# Patient Record
Sex: FEMALE | Employment: UNEMPLOYED | URBAN - METROPOLITAN AREA
[De-identification: names, ages, dates, MRNs, and addresses within clinical notes are randomized per-mention and may not be internally consistent; named-entity substitution may affect disease eponyms.]

---

## 2019-03-23 ENCOUNTER — HOSPITAL ENCOUNTER (EMERGENCY)
Facility: CLINIC | Age: 39
Discharge: HOME | End: 2019-03-23
Attending: EMERGENCY MEDICINE | Admitting: EMERGENCY MEDICINE
Payer: COMMERCIAL

## 2019-03-23 VITALS
SYSTOLIC BLOOD PRESSURE: 112 MMHG | WEIGHT: 114 LBS | BODY MASS INDEX: 17.89 KG/M2 | OXYGEN SATURATION: 99 % | RESPIRATION RATE: 16 BRPM | TEMPERATURE: 98.6 F | HEART RATE: 88 BPM | DIASTOLIC BLOOD PRESSURE: 64 MMHG | HEIGHT: 67 IN

## 2019-03-23 DIAGNOSIS — R42 LIGHT HEADEDNESS: ICD-10-CM

## 2019-03-23 DIAGNOSIS — R07.89 CHEST PRESSURE: ICD-10-CM

## 2019-03-23 LAB
ALBUMIN UR-MCNC: NEGATIVE MG/DL
ANION GAP SERPL CALCULATED.3IONS-SCNC: 12 MMOL/L (ref 3–14)
APPEARANCE UR: CLEAR
BASOPHILS # BLD AUTO: 0 10E9/L (ref 0–0.2)
BASOPHILS NFR BLD AUTO: 0.2 %
BILIRUB UR QL STRIP: NEGATIVE
BUN SERPL-MCNC: 4 MG/DL (ref 7–30)
CALCIUM SERPL-MCNC: 8.7 MG/DL (ref 8.5–10.1)
CHLORIDE SERPL-SCNC: 101 MMOL/L (ref 94–109)
CO2 SERPL-SCNC: 19 MMOL/L (ref 20–32)
COLOR UR AUTO: ABNORMAL
CREAT SERPL-MCNC: 0.57 MG/DL (ref 0.52–1.04)
DIFFERENTIAL METHOD BLD: ABNORMAL
EOSINOPHIL # BLD AUTO: 0 10E9/L (ref 0–0.7)
EOSINOPHIL NFR BLD AUTO: 0.2 %
ERYTHROCYTE [DISTWIDTH] IN BLOOD BY AUTOMATED COUNT: 11.7 % (ref 10–15)
GFR SERPL CREATININE-BSD FRML MDRD: >90 ML/MIN/{1.73_M2}
GLUCOSE SERPL-MCNC: 85 MG/DL (ref 70–99)
GLUCOSE UR STRIP-MCNC: NEGATIVE MG/DL
HCT VFR BLD AUTO: 34.5 % (ref 35–47)
HGB BLD-MCNC: 12.3 G/DL (ref 11.7–15.7)
HGB UR QL STRIP: NEGATIVE
IMM GRANULOCYTES # BLD: 0 10E9/L (ref 0–0.4)
IMM GRANULOCYTES NFR BLD: 0.2 %
INTERPRETATION ECG - MUSE: NORMAL
KETONES UR STRIP-MCNC: 40 MG/DL
LEUKOCYTE ESTERASE UR QL STRIP: NEGATIVE
LYMPHOCYTES # BLD AUTO: 1.1 10E9/L (ref 0.8–5.3)
LYMPHOCYTES NFR BLD AUTO: 18.2 %
MCH RBC QN AUTO: 31.4 PG (ref 26.5–33)
MCHC RBC AUTO-ENTMCNC: 35.7 G/DL (ref 31.5–36.5)
MCV RBC AUTO: 88 FL (ref 78–100)
MONOCYTES # BLD AUTO: 0.4 10E9/L (ref 0–1.3)
MONOCYTES NFR BLD AUTO: 6 %
NEUTROPHILS # BLD AUTO: 4.5 10E9/L (ref 1.6–8.3)
NEUTROPHILS NFR BLD AUTO: 75.2 %
NITRATE UR QL: NEGATIVE
NRBC # BLD AUTO: 0 10*3/UL
NRBC BLD AUTO-RTO: 0 /100
PH UR STRIP: 5 PH (ref 5–7)
PLATELET # BLD AUTO: 172 10E9/L (ref 150–450)
POTASSIUM SERPL-SCNC: 3.6 MMOL/L (ref 3.4–5.3)
RBC # BLD AUTO: 3.92 10E12/L (ref 3.8–5.2)
SODIUM SERPL-SCNC: 132 MMOL/L (ref 133–144)
SOURCE: ABNORMAL
SP GR UR STRIP: 1 (ref 1–1.03)
TROPONIN I SERPL-MCNC: <0.015 UG/L (ref 0–0.04)
UROBILINOGEN UR STRIP-MCNC: NORMAL MG/DL (ref 0–2)
WBC # BLD AUTO: 6 10E9/L (ref 4–11)

## 2019-03-23 PROCEDURE — 93005 ELECTROCARDIOGRAM TRACING: CPT

## 2019-03-23 PROCEDURE — 99284 EMERGENCY DEPT VISIT MOD MDM: CPT

## 2019-03-23 PROCEDURE — 85025 COMPLETE CBC W/AUTO DIFF WBC: CPT | Performed by: EMERGENCY MEDICINE

## 2019-03-23 PROCEDURE — 80048 BASIC METABOLIC PNL TOTAL CA: CPT | Performed by: EMERGENCY MEDICINE

## 2019-03-23 PROCEDURE — 81003 URINALYSIS AUTO W/O SCOPE: CPT | Performed by: EMERGENCY MEDICINE

## 2019-03-23 PROCEDURE — 84484 ASSAY OF TROPONIN QUANT: CPT | Performed by: EMERGENCY MEDICINE

## 2019-03-23 ASSESSMENT — ENCOUNTER SYMPTOMS
FEVER: 0
LIGHT-HEADEDNESS: 1
BLOOD IN STOOL: 0
SHORTNESS OF BREATH: 0
VOMITING: 0
DIARRHEA: 0
FATIGUE: 1
HEMATURIA: 0
DIFFICULTY URINATING: 0
DYSURIA: 0
COUGH: 0
WEAKNESS: 1
ABDOMINAL PAIN: 0

## 2019-03-23 ASSESSMENT — MIFFLIN-ST. JEOR: SCORE: 1229.73

## 2019-03-23 NOTE — ED AVS SNAPSHOT
Emergency Department  [address] [place] [id number]  Phone:  [phone]  Fax:  [phone]                                    Jolly Rogers   MRN: [id number]    Department:   Emergency Department   Date of Visit:  3/23/2019           After Visit Summary Signature Page    I have received my discharge instructions, and my questions have been answered. I have discussed any challenges I see with this plan with the nurse or doctor.    ..........................................................................................................................................  Patient/Patient Representative Signature      ..........................................................................................................................................  Patient Representative Print Name and Relationship to Patient    ..................................................               ................................................  Date                                   Time    ..........................................................................................................................................  Reviewed by Signature/Title    ...................................................              ..............................................  Date                                               Time          22EPIC Rev 08/18

## 2019-03-23 NOTE — ED PROVIDER NOTES
History     Chief Complaint:  Fatigue     HPI   Jolly Rogers is a 39 year old female who presents to the emergency department today for evaluation of fatigue. Patient states she has been feeling increased fatigue and weakness since yesterday. She also endorses feeling light headed, almost as if she is going to pass out, however, lying down makes her feel better. Additionally, she endorses chest pressure and difficulty finding her words. She states when the nurse was asking her questions that she knew the answer to, it was taking her a longer time to find the right response. Patient denies chest pain, shortness of breath, fever, cough, cold, diarrhea, vomiting, black bloody stools, abdominal pain, leg swelling, vaginal bleeding, difficulty urinating, dysuria, or hematuria. She states there is no chance of pregnancy.    Cardiac/PE/DVT Risk Factors:  History of hypertension - negative  History of hyperlipidemia - negative  History of diabetes - negative  History of smoking - negative  Family history of heart complications - positive  Recent travel - negative  Recent surgery - negative  Hormone use - negative    Allergies:  No Known Drug Allergies    Medications:    Medications reviewed. No current medications.     Past Medical History:    Medical history reviewed. No pertinent medical history.    Past Surgical History:    Surgical history reviewed. No pertinent surgical history.    Family History:    Family history reviewed. No pertinent family history.     Social History:  The patient was accompanied to the ED by herself.  Smoking Status: Never Smoker  Smokeless Tobacco: Never Used  Alcohol Use: Positive  Drug Use: Negative    Review of Systems   Constitutional: Positive for fatigue. Negative for fever.   Respiratory: Negative for cough and shortness of breath.         Chest pressure   Cardiovascular: Negative for chest pain and leg swelling.   Gastrointestinal: Negative for abdominal pain, blood in stool,  "diarrhea and vomiting.   Genitourinary: Negative for difficulty urinating, dysuria, hematuria and vaginal bleeding.   Neurological: Positive for weakness and light-headedness.        Difficulty word finding   All other systems reviewed and are negative.      Physical Exam     Patient Vitals for the past 24 hrs:   BP Temp Temp src Pulse Heart Rate Resp SpO2 Height Weight   03/23/19 1740 112/64 98.6  F (37  C) Oral 88 -- 16 99 % -- --   03/23/19 1524 117/59 98.6  F (37  C) Temporal -- 90 14 100 % 1.702 m (5' 7\") 51.7 kg (114 lb)       Physical Exam  Eyes:  The pupils are equal and round    Conjunctivae and sclerae are normal  ENT:    The nose is normal    Pinnae are normal    The oropharynx is normal  CV:  Regular rate and rhythm     No edema  Resp:  Lungs are clear    Non-labored    No rales    No wheezing   GI:  Abdomen is soft and non-tender, there is no rigidity    No distension    No rebound tenderness   MS:  Normal muscular tone    No asymmetric leg swelling  Skin:  No rash or acute skin lesions noted  Neuro:   Awake, alert.      Speech is normal and fluent.    EOMI    PERRL     equal in upper extremities    Face is symmetric.     Moves all extremities    Emergency Department Course     ECG:  ECG taken at 1541, ECG read at 1600  Normal sinus rhythm  ST & T wave abnormality, consider inferior ischemia  Abnormal ECG  Rate 86 bpm. AR interval 138 ms. QRS duration 74 ms. QT/QTc 348/416 ms. P-R-T axes * 71 230.    Laboratory:  Laboratory findings were communicated with the patient who voiced understanding of the findings.    UA reflex to microscopic and culture: ketones 40(A), specific gravity 1.001(A), o/w WNL  Troponin (Collected 1620): <0.015  CBC: WBC 6.0, HGB 12.3,   BMP: sodium 132(L), carbon dioxide 19(L), urea nitrogen 4(L) o/w WNL (Creatinine 0.57)    Emergency Department Course:    1543 The patient provided a urine sample here in the emergency department. This was sent for laboratory testing, " findings above.    1601 Nursing notes and vitals reviewed.    1603 I performed an exam of the patient as documented above.     1620 IV was inserted and blood was drawn for laboratory testing, results above.    1658 Patient rechecked and updated.     1733 Patient rechecked and updated.     1741 I personally reviewed the lab results with the patient and answered all related questions prior to discharge.    Impression & Plan      Medical Decision Making:  Jolly Rogers is a 39 year old female who presents to the emergency department today for evaluation of dizziness, chest pressure and feeling fatigued and mentally foggy.  This is been happening since yesterday.  She is been fasting since that time.  Labs here reveal a low bicarbonate level.  Her sodium is also slightly low.  I think it is likely secondary to not having anything to eat for the past 24 hours.  Troponin was negative.  EKG showed nonspecific changes.  Given duration of symptoms I doubt acute coronary syndrome.  No vital sign abnormalities, medications, hemoptysis or other symptoms to suggest pulmonary embolism.  She is PERC negative.    She also did bring up concerns about possibly being poisoned by [place].  She is from [facility] and has been posting on social media about her support for the [place] president and is worried that they may be trying to harm her due to her support.  She does not know that she is specifically poison.  She is concerned that radiation or other poisoning may be causing her symptoms.    Vital signs here are normal.  Her neurologic exam is normal, she is clear with her history and does not seem to have any difficulty remembering things at this time.  I encouraged her to try eating something or having an infusion of dextrose.  She ultimately declined and preferred to be discharged home.    Encouraged her to follow-up with primary care provider return with any new or concerning symptoms.    Diagnosis:    ICD-10-CM    1. Light  headedness R42    2. Chest pressure R07.89      Disposition:   The patient is discharged to home.    Discharge Medications:  No discharge medications    Scribe Disclosure:  I, Prerna Palma, am serving as a scribe at 4:02 PM on 3/23/2019 to document services personally performed by Georges Tam MD based on my observations and the provider's statements to me.     EMERGENCY DEPARTMENT       Georges Tam MD  03/24/19 0004